# Patient Record
Sex: MALE | Race: OTHER | Employment: FULL TIME | URBAN - METROPOLITAN AREA
[De-identification: names, ages, dates, MRNs, and addresses within clinical notes are randomized per-mention and may not be internally consistent; named-entity substitution may affect disease eponyms.]

---

## 2022-12-20 ENCOUNTER — HOSPITAL ENCOUNTER (EMERGENCY)
Age: 27
Discharge: HOME | End: 2022-12-20
Attending: EMERGENCY MEDICINE

## 2022-12-20 VITALS
RESPIRATION RATE: 19 BRPM | HEIGHT: 69 IN | DIASTOLIC BLOOD PRESSURE: 79 MMHG | SYSTOLIC BLOOD PRESSURE: 139 MMHG | OXYGEN SATURATION: 97 % | BODY MASS INDEX: 46.65 KG/M2 | TEMPERATURE: 98.2 F | WEIGHT: 315 LBS | HEART RATE: 87 BPM

## 2022-12-20 DIAGNOSIS — U07.1 COVID-19: Primary | ICD-10-CM

## 2022-12-20 LAB
FLUAV AG NPH QL IA: NEGATIVE
FLUBV AG NPH QL IA: NEGATIVE
SARS-COV-2 RDRP RESP QL NAA+PROBE: DETECTED
SOURCE: ABNORMAL
SPECIMEN SOURCE: NORMAL
STREP, MOLECULAR: NOT DETECTED

## 2022-12-20 PROCEDURE — 87635 SARS-COV-2 COVID-19 AMP PRB: CPT

## 2022-12-20 PROCEDURE — 87651 STREP A DNA AMP PROBE: CPT

## 2022-12-20 PROCEDURE — 99283 EMERGENCY DEPT VISIT LOW MDM: CPT

## 2022-12-20 PROCEDURE — 87804 INFLUENZA ASSAY W/OPTIC: CPT

## 2022-12-20 ASSESSMENT — PAIN - FUNCTIONAL ASSESSMENT: PAIN_FUNCTIONAL_ASSESSMENT: 0-10

## 2022-12-20 ASSESSMENT — PAIN SCALES - GENERAL: PAINLEVEL_OUTOF10: 8

## 2022-12-20 NOTE — Clinical Note
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 He may return to work on 12/25/2022. If you have any questions or concerns, please don't hesitate to call.       MONICA Call

## 2022-12-20 NOTE — Clinical Note
Ladan Comer was seen and treated in our emergency department on 12/20/2022. He may return to work on 12/25/2022. If you have any questions or concerns, please don't hesitate to call.       MONICA Wiggins

## 2022-12-20 NOTE — DISCHARGE INSTRUCTIONS
You have tested positive for COVID-19. Isolate for 5 days until you are symptom-free for 24 hours. You may take over-the-counter medications for symptom relief as needed. Return if your symptoms worsen.

## 2022-12-20 NOTE — ED NOTES
I have reviewed discharge instructions with the patient. The patient verbalized understanding. Patient left ED via Discharge Method: ambulatory to Home with family. Opportunity for questions and clarification provided. Patient given 0 scripts. To continue your aftercare when you leave the hospital, you may receive an automated call from our care team to check in on how you are doing. This is a free service and part of our promise to provide the best care and service to meet your aftercare needs.  If you have questions, or wish to unsubscribe from this service please call [phone]. Thank you for Choosing our [facility].         Nery Lopez RN  12/20/22 9919

## 2022-12-20 NOTE — ED PROVIDER NOTES
[facility] Emergency Department Provider Note                   PCP:                None Provider               Age: 32 y.o. Sex: male       ICD-10-CM    1. COVID-19  U5.3           DISPOSITION Decision To Discharge 12/20/2022 03:10:38 PM         Orders Placed This Encounter   Procedures    COVID-19, Rapid    Rapid influenza A/B antigens    Group A Strep Screen By PCR        Denisse Cat is a 32 y.o. male who presents to the Emergency Department with chief complaint of    Chief Complaint   Patient presents with    Sinusitis    Pharyngitis      66-year-old male presenting to the emergency department chief complaint of 4-day history of sinus congestion, headaches, and body aches. He reports family members in his household have been sick recently. He is also complaining of sore throat. The history is provided by the patient. Review of Systems    Past Medical History:   Diagnosis Date    Migraines         History reviewed. No pertinent surgical history. History reviewed. No pertinent family history. Social History     Socioeconomic History    Marital status: Single     Spouse name: None    Number of children: None    Years of education: None    Highest education level: None         Patient has no known allergies. Previous Medications    No medications on file        Vitals signs and nursing note reviewed. Patient Vitals for the past 4 hrs:   Temp Pulse Resp BP SpO2   12/20/22 1429 98.2 °F (36.8 °C) 92 16 (!) 145/88 96 %          Physical Exam  Vitals reviewed. Constitutional:       Appearance: Normal appearance. HENT:      Head: Normocephalic and atraumatic. Nose: No congestion or rhinorrhea. Mouth/Throat:      Mouth: Mucous membranes are moist.   Eyes:      Extraocular Movements: Extraocular movements intact. Conjunctiva/sclera: Conjunctivae normal.      Pupils: Pupils are equal, round, and reactive to light.    Cardiovascular:      Rate and Rhythm: Normal rate and regular rhythm. Pulses: Normal pulses. Heart sounds: Normal heart sounds. No murmur heard. Pulmonary:      Effort: Pulmonary effort is normal. No respiratory distress. Breath sounds: Normal breath sounds. Abdominal:      General: Abdomen is flat. There is no distension. Palpations: Abdomen is soft. Tenderness: There is no abdominal tenderness. There is no guarding. Genitourinary:     Penis: Normal.       Testes: Normal.   Musculoskeletal:         General: No swelling or tenderness. Normal range of motion. Cervical back: Normal range of motion and neck supple. No rigidity. Skin:     General: Skin is warm and dry. Neurological:      General: No focal deficit present. Mental Status: He is alert and oriented to person, place, and time. Psychiatric:         Mood and Affect: Mood normal.         Behavior: Behavior normal.         Thought Content: Thought content normal.         Judgment: Judgment normal.        MDM  Number of Diagnoses or Management Options  COVID-19  Diagnosis management comments: 57-year-old male presenting with chief complaint of flulike symptoms. Flu, COVID, streptococcal infection    Obtain flu, COVID, and strep swabs. COVID positive. Discussed symptomatic treatment with patient. Discussed return precautions. Family inquiring about hydroxychloroquine and and monoclonal antibody treatment. I advised him of the inefficacy of these treatments as assessed by evidence-based medicine. Being that the patient is out of 48-hour window of symptoms, he is not a candidate for Paxlovid therapy.        Amount and/or Complexity of Data Reviewed  Clinical lab tests: ordered and reviewed    Patient Progress  Patient progress: stable      Procedures      Labs Reviewed   COVID-19, RAPID - Abnormal; Notable for the following components:       Result Value    SARS-CoV-2, Rapid Detected (*)     All other components within normal limits   RAPID INFLUENZA A/B ANTIGENS GROUP A STREP SCREEN BY PCR        No orders to display                          Voice dictation software was used during the making of this note. This software is not perfect and grammatical and other typographical errors may be present. This note has not been completely proofread for errors.      MONICA Kitchen  12/20/22 1525